# Patient Record
(demographics unavailable — no encounter records)

---

## 2024-12-10 NOTE — ASSESSMENT
[FreeTextEntry1] : ASSESSMENT: The patient comes in today with chronic exacerbated wrist and hand discomfort as well as numbness and tingling. She describes both daytime and nighttime symptoms. The symptoms are affecting her ADLs. Symptoms are consistent with carpal tunnel syndrome. She describes tremendous relief after prior injections for this condition.  With this in mind we have discussed her history of shingles which has resulted in significant upper extremity irritation and itching per patient.  We have discussed carpal tunnel injections which are helpful in a therapeutic and diagnostic manner.  The patient was adequately and thoroughly informed of my assessment of their current condition(s). - This may diminish bodily function for the extremity. We discussed prognosis, treatment modalities including operative and nonoperative options for the above diagnostic assessment. As always, 2nd opinion is always provided as an option. For this, when accessible, I was able to review other physicians note(s) including reviewing other tests, imaging results as well as personally view these results for my own interpretation.  Injection: The risks and benefits of a steroid injection were discussed in detail. The risks include but are not limited to: pain, infection, swelling, flare response, bleeding, subcutaneous fat atrophy, skin depigmentation and/or elevation of blood sugar. The risk of incomplete resolution of symptoms, recurrence and additional intervention was reviewed and considered by the patient. The patient agreed to proceed and under a sterile prep, I injected 1 unit (6mg) into 1 cc of a combination of Celestone and Lidocaine into the [bilateral carpal tunnel]. The patient tolerated the injection well.  The patient was adequately and thoroughly informed of my assessment of their current condition(s).  DISCUSSION: 1.  Bilateral carpal tunnel injection as above. Activity modifications. Follow-up in 3 months as req.  History of shingles.

## 2024-12-10 NOTE — PHYSICAL EXAM
[de-identified] : Examination of the [bilateral] wrist reveals discomfort with flexion and compression at the level of the volar carpal tunnel eliciting numbness/tingling throughout the fingertips. There is a positive tinel. Patient is able to delineate numbness to median-sided digits volarly. [There is no obvious thenar atrophy]    [de-identified] : 4] views of [bilateral hands and wrists] were reviewed today in my office and were seen by me and discussed with the patient. These [show findings consistent with bilateral basal joint OA and findings of IP joint OA

## 2024-12-10 NOTE — HISTORY OF PRESENT ILLNESS
[FreeTextEntry1] : Criselda is a pleasant 72-year-old female who presents today with chronic exacerbated left wrist and hand discomfort as well as numbness and tingling. She describes both daytime and nighttime symptoms. The symptoms are affecting her ADLs. She does have a history of shingles which has resulted in chronic upper extremity irritation numbness and tingling and itching.  She states has noticed a significant improvement with carpal tunnel injections in the past.

## 2025-02-26 NOTE — ASSESSMENT
[FreeTextEntry1] : ASSESSMENT: The patient comes in today with chronic exacerbated bilateral wrist and hand discomfort as well as numbness and tingling. She describes both daytime and nighttime symptoms. Symptoms today are consistent with bilateral carpal tunnel syndrome.  Treatment modalities were discussed, the patient elects for injections.  We have also discussed activity modifications as well as bracing as needed for her condition.   The patient was adequately and thoroughly informed of my assessment of their current condition(s).  - This may diminish bodily function for the extremity.  We discussed prognosis, treatment modalities including operative and nonoperative options for the above diagnostic assessment. As always, 2nd opinion is always provided as an option. For this, when accessible, I was able to review other physicians note(s) including reviewing other tests, imaging results as well as personally view these results for my own interpretation.      Injection:   The risks and benefits of a steroid injection were discussed in detail. The risks include but are not limited to: pain, infection, swelling, flare response, bleeding, subcutaneous fat atrophy, skin depigmentation and/or elevation of blood sugar. The risk of incomplete resolution of symptoms, recurrence and additional intervention was reviewed and considered by the patient.  The patient agreed to proceed and under a sterile prep, I injected 1 unit (6mg) into 1 cc of a combination of Celestone and Lidocaine into the [bilateral carpal tunnel]. The patient tolerated the injection well.   The patient was adequately and thoroughly informed of my assessment of their current condition(s).   DISCUSSION: 1.  Bilateral carpal tunnel injections as above.  Activity modifications.  Bracing.  Follow-up in 3 months. 2. [x] 3. [x]

## 2025-02-26 NOTE — HISTORY OF PRESENT ILLNESS
[FreeTextEntry1] : Criselda is a pleasant 73-year-old female who presents today with chronic exacerbated bilateral wrist and hand discomfort as well as numbness and tingling. She describes both daytime and nighttime symptoms. The symptoms are affecting her ADLs.

## 2025-02-26 NOTE — PHYSICAL EXAM
[de-identified] : Exam [bilateral] wrist + Durkan's w/ + NT. There is + Tinel. Patient is able to delineate numbness to median-sided digits volarly. [There is no obvious thenar atrophy]    [de-identified] : [4] views of [bilateral hands and wrists] were reviewed today in my office and were seen by me and discussed with the patient. These [show findings consistent with bilateral basal joint OA and findings of IP joint OA]

## 2025-05-08 NOTE — HISTORY OF PRESENT ILLNESS
[FreeTextEntry1] : Criselda is a pleasant 73-year-old female who presents today with chronic exacerbated bilateral wrist and hand discomfort as well as numbness and tingling. She describes both daytime and nighttime symptoms. The symptoms are affecting her ADLs.  She has done well with prior injections, however some symptoms persist.

## 2025-05-08 NOTE — ASSESSMENT
[FreeTextEntry1] : ASSESSMENT: The patient comes in today with chronic exacerbated bilateral wrist and hand discomfort as well as numbness and tingling.  She has done very well with prior injections which we are both delighted with, however some symptoms persist.  She describes both daytime and nighttime symptoms. Symptoms today are consistent with bilateral carpal tunnel syndrome.  Treatment modalities were discussed, the patient elects for injections as they have been helpful.  We have also discussed activity modifications as well as bracing as needed for her condition.  We have discussed carpal tunnel release surgery which this patient is considering.   The patient was adequately and thoroughly informed of my assessment of their current condition(s).  - This may diminish bodily function for the extremity.  We discussed prognosis, treatment modalities including operative and nonoperative options for the above diagnostic assessment. As always, 2nd opinion is always provided as an option. For this, when accessible, I was able to review other physicians note(s) including reviewing other tests, imaging results as well as personally view these results for my own interpretation.      Injection Procedure: [Bilateral carpal tunnel] The risks and benefits of a steroid injection were discussed in detail. The risks include but are not limited to: pain, infection, swelling, flare response, bleeding, subcutaneous fat atrophy, skin depigmentation and/or elevation of blood sugar. The risk of incomplete resolution of symptoms, recurrence and additional intervention was reviewed and considered by the patient.  The patient agreed to proceed and under a sterile prep, I injected 1 unit (6mg) into 1 cc of a combination of Celestone and Lidocaine into the above stated location for the procedure. The patient tolerated the injection well.   The patient was adequately and thoroughly informed of my assessment of their current condition(s).   DISCUSSION: 1.  Injections as above.  Activity modifications.  Bracing.  Positive response. 2.  She is considering carpal tunnel release at the end of the summer. 3.  Follow-up in 6 weeks.

## 2025-05-08 NOTE — PHYSICAL EXAM
[de-identified] : Exam [bilateral] wrist + Durkan's w/ + NT. There is + Tinel. Patient is able to delineate numbness to median-sided digits volarly. [There is no obvious thenar atrophy]    [de-identified] : [4] views of [bilateral hands and wrists] were reviewed today in my office and were seen by me and discussed with the patient. These [show findings consistent with bilateral basal joint OA and findings of IP joint OA]

## 2025-07-22 NOTE — ASSESSMENT
[FreeTextEntry1] : ASSESSMENT: The patient comes in today with chronic exacerbated wrist and hand discomfort as well as numbness and tingling. She describes both daytime and nighttime symptoms. The symptoms are affecting her ADLs. Symptoms are consistent with carpal tunnel syndrome. She describes tremendous relief after prior injections for this condition.  With this in mind we have discussed her history of shingles which has resulted in significant upper extremity irritation and itching per patient.  She has done tremendously well with carpal tunnel injections.  At this point due to recurrence she would like to proceed with surgery.  She understands that with her underlying history of shingles condition she will not have full recovery.  The patient has significant findings consistent with carpal tunnel syndrome. We discussed nerve study findings when available and prognosis of this condition.  I told them that surgery would be of significant benefit for their acute painful symptoms, however the efficacy of surgery for sensory and motor recovery will be determined only with time.  These might not improve at all. With this in mind they elected to proceed with a carpal tunnel release.   Surgical Consent Discussion:   I explained the risks and benefits of surgical intervention to the patient. The risks include, but are not limited to: pain, infection, swelling, stiffness, injury to underlying neurovascular structures, scar sensitivity, incomplete resolution of symptoms, the possibility of the need for future surgery and finally the need to comply with a post-operative occupational therapy protocol. She understands, agrees and informed consent was obtained. The patients surgery will be scheduled in the near future.  The patient was adequately and thoroughly informed of my assessment of their current condition(s). - This may diminish bodily function for the extremity. We discussed prognosis, treatment modalities including operative and nonoperative options for the above diagnostic assessment. As always, 2nd opinion is always provided as an option. For this, when accessible, I was able to review other physicians note(s) including reviewing other tests, imaging results as well as personally view these results for my own interpretation.  The patient was adequately and thoroughly informed of my assessment of their current condition(s).  DISCUSSION: 1.  She elected proceed with left carpal tunnel release.  History of shingles discussed thoroughly once again.  History of very positive response as well

## 2025-07-22 NOTE — HISTORY OF PRESENT ILLNESS
[FreeTextEntry1] : Criselda is a pleasant 74-year-old female who presents today with chronic exacerbated left wrist and hand discomfort as well as numbness and tingling. She describes both daytime and nighttime symptoms. The symptoms are affecting her ADLs. She does have a history of shingles which has resulted in chronic upper extremity irritation numbness and tingling and itching.  She states has noticed a significant improvement with carpal tunnel injections.

## 2025-07-22 NOTE — PHYSICAL EXAM
[de-identified] : Exam [bilateral] wrist + Durkan's with + N/T. There is + tinel. Patient is able to delineate numbness to median-sided digits volarly. [No obvious thenar atrophy]  [de-identified] : 4] views of [bilateral hands and wrists] were reviewed today in my office and were seen by me and discussed with the patient. These [show findings consistent with bilateral basal joint OA and findings of IP joint OA